# Patient Record
Sex: FEMALE | Race: WHITE | Employment: UNEMPLOYED | URBAN - METROPOLITAN AREA
[De-identification: names, ages, dates, MRNs, and addresses within clinical notes are randomized per-mention and may not be internally consistent; named-entity substitution may affect disease eponyms.]

---

## 2018-01-01 ENCOUNTER — HOSPITAL ENCOUNTER (INPATIENT)
Age: 83
LOS: 1 days | DRG: 296 | End: 2018-05-18
Attending: INTERNAL MEDICINE | Admitting: INTERNAL MEDICINE
Payer: MEDICARE

## 2018-01-01 ENCOUNTER — APPOINTMENT (OUTPATIENT)
Dept: CT IMAGING | Age: 83
DRG: 296 | End: 2018-01-01
Attending: INTERNAL MEDICINE
Payer: MEDICARE

## 2018-01-01 ENCOUNTER — HOSPITAL ENCOUNTER (OUTPATIENT)
Dept: LAB | Age: 83
Discharge: HOME | End: 2018-04-21
Payer: MEDICARE

## 2018-01-01 ENCOUNTER — APPOINTMENT (OUTPATIENT)
Dept: GENERAL RADIOLOGY | Age: 83
End: 2018-01-01
Attending: NURSE PRACTITIONER
Payer: MEDICARE

## 2018-01-01 ENCOUNTER — HOSPITAL ENCOUNTER (EMERGENCY)
Age: 83
Discharge: HOME | End: 2018-03-31
Attending: EMERGENCY MEDICINE
Payer: MEDICARE

## 2018-01-01 ENCOUNTER — APPOINTMENT (OUTPATIENT)
Dept: GENERAL RADIOLOGY | Age: 83
DRG: 296 | End: 2018-01-01
Attending: INTERNAL MEDICINE
Payer: MEDICARE

## 2018-01-01 VITALS
HEART RATE: 90 BPM | RESPIRATION RATE: 18 BRPM | HEIGHT: 62 IN | DIASTOLIC BLOOD PRESSURE: 65 MMHG | BODY MASS INDEX: 16.05 KG/M2 | OXYGEN SATURATION: 100 % | WEIGHT: 87.2 LBS | TEMPERATURE: 98 F | SYSTOLIC BLOOD PRESSURE: 95 MMHG

## 2018-01-01 DIAGNOSIS — R77.8 ELEVATED TROPONIN: ICD-10-CM

## 2018-01-01 DIAGNOSIS — R09.2 RESPIRATORY ARREST (HCC): Primary | ICD-10-CM

## 2018-01-01 DIAGNOSIS — M79.89 LEG SWELLING: ICD-10-CM

## 2018-01-01 DIAGNOSIS — A41.9 SEPSIS, DUE TO UNSPECIFIED ORGANISM: ICD-10-CM

## 2018-01-01 DIAGNOSIS — G93.1 ANOXIC BRAIN DAMAGE (HCC): ICD-10-CM

## 2018-01-01 DIAGNOSIS — R06.02 SOB (SHORTNESS OF BREATH): ICD-10-CM

## 2018-01-01 DIAGNOSIS — R91.8 PULMONARY INFILTRATES: ICD-10-CM

## 2018-01-01 DIAGNOSIS — R79.89 ELEVATED BRAIN NATRIURETIC PEPTIDE (BNP) LEVEL: Primary | ICD-10-CM

## 2018-01-01 DIAGNOSIS — E87.1 HYPONATREMIA: ICD-10-CM

## 2018-01-01 DIAGNOSIS — I46.9 CARDIAC ARREST (HCC): ICD-10-CM

## 2018-01-01 LAB
ALBUMIN SERPL-MCNC: 2.9 G/DL (ref 3.4–5)
ALBUMIN SERPL-MCNC: 3.4 G/DL (ref 3.4–5)
ALBUMIN/GLOB SERPL: 0.7 {RATIO} (ref 0.8–1.7)
ALBUMIN/GLOB SERPL: 0.7 {RATIO} (ref 0.8–1.7)
ALP SERPL-CCNC: 150 U/L (ref 45–117)
ALP SERPL-CCNC: 81 U/L (ref 45–117)
ALT SERPL-CCNC: 25 U/L (ref 13–56)
ALT SERPL-CCNC: 940 U/L (ref 13–56)
ANION GAP BLD CALC-SCNC: 21 MMOL/L (ref 10–20)
ANION GAP SERPL CALC-SCNC: 21 MMOL/L (ref 3–18)
ANION GAP SERPL CALC-SCNC: 6 MMOL/L (ref 3–18)
ANION GAP SERPL CALC-SCNC: 8 MMOL/L (ref 3–18)
APPEARANCE UR: CLEAR
APPEARANCE UR: CLEAR
ARTERIAL PATENCY WRIST A: YES
AST SERPL-CCNC: 1246 U/L (ref 15–37)
AST SERPL-CCNC: 23 U/L (ref 15–37)
ATRIAL RATE: 91 BPM
BACTERIA URNS QL MICRO: ABNORMAL /HPF
BACTERIA URNS QL MICRO: NEGATIVE /HPF
BASE DEFICIT BLD-SCNC: 18 MMOL/L
BASOPHILS # BLD: 0 K/UL (ref 0–0.06)
BASOPHILS # BLD: 0 K/UL (ref 0–0.06)
BASOPHILS # BLD: 0.1 K/UL (ref 0–0.1)
BASOPHILS NFR BLD: 0 % (ref 0–2)
BASOPHILS NFR BLD: 0 % (ref 0–2)
BASOPHILS NFR BLD: 1 % (ref 0–3)
BDY SITE: ABNORMAL
BILIRUB SERPL-MCNC: 0.3 MG/DL (ref 0.2–1)
BILIRUB SERPL-MCNC: 0.5 MG/DL (ref 0.2–1)
BILIRUB UR QL: NEGATIVE
BILIRUB UR QL: NEGATIVE
BNP SERPL-MCNC: 9264 PG/ML (ref 0–1800)
BUN BLD-MCNC: 32 MG/DL (ref 7–18)
BUN SERPL-MCNC: 24 MG/DL (ref 7–18)
BUN SERPL-MCNC: 25 MG/DL (ref 7–18)
BUN SERPL-MCNC: 26 MG/DL (ref 7–18)
BUN/CREAT SERPL: 17 (ref 12–20)
BUN/CREAT SERPL: 20 (ref 12–20)
BUN/CREAT SERPL: 25 (ref 12–20)
CA-I BLD-MCNC: 1.09 MMOL/L (ref 1.12–1.32)
CALCIUM SERPL-MCNC: 8.1 MG/DL (ref 8.5–10.1)
CALCIUM SERPL-MCNC: 8.9 MG/DL (ref 8.5–10.1)
CALCIUM SERPL-MCNC: 9 MG/DL (ref 8.5–10.1)
CALCULATED P AXIS, ECG09: 70 DEGREES
CALCULATED R AXIS, ECG10: -30 DEGREES
CALCULATED T AXIS, ECG11: 86 DEGREES
CHLORIDE BLD-SCNC: 96 MMOL/L (ref 100–108)
CHLORIDE SERPL-SCNC: 93 MMOL/L (ref 100–108)
CHLORIDE SERPL-SCNC: 94 MMOL/L (ref 100–108)
CHLORIDE SERPL-SCNC: 95 MMOL/L (ref 100–108)
CK MB CFR SERPL CALC: 1.8 % (ref 0–4)
CK MB SERPL-MCNC: 2.9 NG/ML (ref 5–25)
CK SERPL-CCNC: 157 U/L (ref 26–192)
CO2 BLD-SCNC: 18 MMOL/L (ref 19–24)
CO2 SERPL-SCNC: 16 MMOL/L (ref 21–32)
CO2 SERPL-SCNC: 28 MMOL/L (ref 21–32)
CO2 SERPL-SCNC: 30 MMOL/L (ref 21–32)
COLOR UR: YELLOW
COLOR UR: YELLOW
CREAT SERPL-MCNC: 1.06 MG/DL (ref 0.6–1.3)
CREAT SERPL-MCNC: 1.21 MG/DL (ref 0.6–1.3)
CREAT SERPL-MCNC: 1.49 MG/DL (ref 0.6–1.3)
CREAT UR-MCNC: 1.4 MG/DL (ref 0.6–1.3)
DIAGNOSIS, 93000: NORMAL
DIFFERENTIAL METHOD BLD: ABNORMAL
EOSINOPHIL # BLD: 0 K/UL (ref 0–0.4)
EOSINOPHIL # BLD: 0.6 K/UL (ref 0–0.4)
EOSINOPHIL # BLD: 0.8 K/UL (ref 0–0.4)
EOSINOPHIL NFR BLD: 0 % (ref 0–5)
EOSINOPHIL NFR BLD: 7 % (ref 0–5)
EOSINOPHIL NFR BLD: 8 % (ref 0–5)
EPITH CASTS URNS QL MICRO: ABNORMAL /LPF (ref 0–5)
EPITH CASTS URNS QL MICRO: NORMAL /LPF (ref 0–5)
ERYTHROCYTE [DISTWIDTH] IN BLOOD BY AUTOMATED COUNT: 12.7 % (ref 11.6–14.5)
ERYTHROCYTE [DISTWIDTH] IN BLOOD BY AUTOMATED COUNT: 13.8 % (ref 11.6–14.5)
ERYTHROCYTE [DISTWIDTH] IN BLOOD BY AUTOMATED COUNT: 14.3 % (ref 11.6–14.5)
FAX TO INFO,FAXT: NORMAL
FAX TO NUMBER,FAXN: NORMAL
GAS FLOW.O2 O2 DELIVERY SYS: ABNORMAL L/MIN
GAS FLOW.O2 SETTING OXYMISER: 16 BPM
GLOBULIN SER CALC-MCNC: 3.9 G/DL (ref 2–4)
GLOBULIN SER CALC-MCNC: 4.6 G/DL (ref 2–4)
GLUCOSE BLD STRIP.AUTO-MCNC: 164 MG/DL (ref 74–106)
GLUCOSE SERPL-MCNC: 104 MG/DL (ref 74–99)
GLUCOSE SERPL-MCNC: 175 MG/DL (ref 74–99)
GLUCOSE SERPL-MCNC: 87 MG/DL (ref 74–99)
GLUCOSE UR STRIP.AUTO-MCNC: NEGATIVE MG/DL
GLUCOSE UR STRIP.AUTO-MCNC: NEGATIVE MG/DL
HCO3 BLD-SCNC: 12.9 MMOL/L (ref 22–26)
HCT VFR BLD AUTO: 34.9 % (ref 35–45)
HCT VFR BLD AUTO: 36 % (ref 35–45)
HCT VFR BLD AUTO: 36.3 % (ref 35–45)
HCT VFR BLD CALC: 36 % (ref 36–49)
HGB BLD-MCNC: 10.7 G/DL (ref 12–16)
HGB BLD-MCNC: 11.7 G/DL (ref 12–16)
HGB BLD-MCNC: 11.9 G/DL (ref 12–16)
HGB BLD-MCNC: 12.2 G/DL (ref 12–16)
HGB UR QL STRIP: ABNORMAL
HGB UR QL STRIP: NEGATIVE
INSPIRATION.DURATION SETTING TIME VENT: 0.9 SEC
KETONES UR QL STRIP.AUTO: NEGATIVE MG/DL
KETONES UR QL STRIP.AUTO: NEGATIVE MG/DL
LACTATE SERPL-SCNC: 12.4 MMOL/L (ref 0.4–2)
LEUKOCYTE ESTERASE UR QL STRIP.AUTO: NEGATIVE
LEUKOCYTE ESTERASE UR QL STRIP.AUTO: NEGATIVE
LYMPHOCYTES # BLD: 1.8 K/UL (ref 0.9–3.6)
LYMPHOCYTES # BLD: 1.9 K/UL (ref 0.9–3.6)
LYMPHOCYTES # BLD: 2.8 K/UL (ref 0.8–3.5)
LYMPHOCYTES NFR BLD: 19 % (ref 21–52)
LYMPHOCYTES NFR BLD: 22 % (ref 20–51)
LYMPHOCYTES NFR BLD: 23 % (ref 21–52)
MAGNESIUM SERPL-MCNC: 1.9 MG/DL (ref 1.6–2.6)
MCH RBC QN AUTO: 31.3 PG (ref 24–34)
MCH RBC QN AUTO: 31.4 PG (ref 24–34)
MCH RBC QN AUTO: 31.6 PG (ref 24–34)
MCHC RBC AUTO-ENTMCNC: 30.7 G/DL (ref 31–37)
MCHC RBC AUTO-ENTMCNC: 32.2 G/DL (ref 31–37)
MCHC RBC AUTO-ENTMCNC: 33.1 G/DL (ref 31–37)
MCV RBC AUTO: 102.9 FL (ref 74–97)
MCV RBC AUTO: 94.7 FL (ref 74–97)
MCV RBC AUTO: 97.3 FL (ref 74–97)
MONOCYTES # BLD: 0.6 K/UL (ref 0.05–1.2)
MONOCYTES # BLD: 0.7 K/UL (ref 0.05–1.2)
MONOCYTES # BLD: 1.1 K/UL (ref 0–1)
MONOCYTES NFR BLD: 6 % (ref 3–10)
MONOCYTES NFR BLD: 8 % (ref 3–10)
MONOCYTES NFR BLD: 9 % (ref 2–9)
MUCOUS THREADS URNS QL MICRO: ABNORMAL /LPF
NEUTS BAND NFR BLD MANUAL: 28 % (ref 0–5)
NEUTS SEG # BLD: 4.9 K/UL (ref 1.8–8)
NEUTS SEG # BLD: 5 K/UL (ref 1.8–8)
NEUTS SEG # BLD: 6.4 K/UL (ref 1.8–8)
NEUTS SEG NFR BLD: 40 % (ref 42–75)
NEUTS SEG NFR BLD: 62 % (ref 40–73)
NEUTS SEG NFR BLD: 67 % (ref 40–73)
NITRITE UR QL STRIP.AUTO: NEGATIVE
NITRITE UR QL STRIP.AUTO: NEGATIVE
O2/TOTAL GAS SETTING VFR VENT: 100 %
P-R INTERVAL, ECG05: 164 MS
PCO2 BLD: 50.2 MMHG (ref 35–45)
PEEP RESPIRATORY: 5 CMH2O
PH BLD: 7.02 [PH] (ref 7.35–7.45)
PH UR STRIP: 6 [PH] (ref 5–8)
PH UR STRIP: 7 [PH] (ref 5–8)
PLATELET # BLD AUTO: 167 K/UL (ref 135–420)
PLATELET # BLD AUTO: 238 K/UL (ref 135–420)
PLATELET # BLD AUTO: 314 K/UL (ref 135–420)
PLATELET COMMENTS,PCOM: ABNORMAL
PMV BLD AUTO: 10.1 FL (ref 9.2–11.8)
PMV BLD AUTO: 9.1 FL (ref 9.2–11.8)
PMV BLD AUTO: 9.5 FL (ref 9.2–11.8)
PO2 BLD: 495 MMHG (ref 80–100)
POTASSIUM BLD-SCNC: 4.1 MMOL/L (ref 3.5–5.5)
POTASSIUM SERPL-SCNC: 4.2 MMOL/L (ref 3.5–5.5)
POTASSIUM SERPL-SCNC: 4.3 MMOL/L (ref 3.5–5.5)
POTASSIUM SERPL-SCNC: 5 MMOL/L (ref 3.5–5.5)
PROT SERPL-MCNC: 6.8 G/DL (ref 6.4–8.2)
PROT SERPL-MCNC: 8 G/DL (ref 6.4–8.2)
PROT UR STRIP-MCNC: 100 MG/DL
PROT UR STRIP-MCNC: 300 MG/DL
Q-T INTERVAL, ECG07: 364 MS
QRS DURATION, ECG06: 84 MS
QTC CALCULATION (BEZET), ECG08: 447 MS
RBC # BLD AUTO: 3.39 M/UL (ref 4.2–5.3)
RBC # BLD AUTO: 3.73 M/UL (ref 4.2–5.3)
RBC # BLD AUTO: 3.8 M/UL (ref 4.2–5.3)
RBC #/AREA URNS HPF: ABNORMAL /HPF (ref 0–5)
RBC #/AREA URNS HPF: NORMAL /HPF (ref 0–5)
RBC MORPH BLD: ABNORMAL
SAO2 % BLD: 100 % (ref 92–97)
SERVICE CMNT-IMP: ABNORMAL
SODIUM BLD-SCNC: 131 MMOL/L (ref 136–145)
SODIUM SERPL-SCNC: 129 MMOL/L (ref 136–145)
SODIUM SERPL-SCNC: 131 MMOL/L (ref 136–145)
SODIUM SERPL-SCNC: 131 MMOL/L (ref 136–145)
SP GR UR REFRACTOMETRY: 1.01 (ref 1–1.03)
SP GR UR REFRACTOMETRY: 1.02 (ref 1–1.03)
SPECIMEN TYPE: ABNORMAL
TOTAL RESP. RATE, ITRR: 16
TROPONIN I BLD-MCNC: 0.84 NG/ML (ref 0–0.08)
TROPONIN I SERPL-MCNC: 1.45 NG/ML (ref 0–0.06)
UROBILINOGEN UR QL STRIP.AUTO: 0.2 EU/DL (ref 0.2–1)
UROBILINOGEN UR QL STRIP.AUTO: 0.2 EU/DL (ref 0.2–1)
VENTILATION MODE VENT: ABNORMAL
VENTRICULAR RATE, ECG03: 91 BPM
VOLUME CONTROL IVLC: YES
VT SETTING VENT: 360 ML
WBC # BLD AUTO: 12.5 K/UL (ref 4.6–13.2)
WBC # BLD AUTO: 8 K/UL (ref 4.6–13.2)
WBC # BLD AUTO: 9.5 K/UL (ref 4.6–13.2)
WBC URNS QL MICRO: ABNORMAL /HPF (ref 0–5)
WBC URNS QL MICRO: NORMAL /HPF (ref 0–5)

## 2018-01-01 PROCEDURE — 74011000250 HC RX REV CODE- 250: Performed by: INTERNAL MEDICINE

## 2018-01-01 PROCEDURE — 77030012003 HC AIRWY OP BRMN VYRM -A

## 2018-01-01 PROCEDURE — 82803 BLOOD GASES ANY COMBINATION: CPT

## 2018-01-01 PROCEDURE — 80048 BASIC METABOLIC PNL TOTAL CA: CPT | Performed by: FAMILY MEDICINE

## 2018-01-01 PROCEDURE — 81001 URINALYSIS AUTO W/SCOPE: CPT | Performed by: INTERNAL MEDICINE

## 2018-01-01 PROCEDURE — 80053 COMPREHEN METABOLIC PANEL: CPT | Performed by: INTERNAL MEDICINE

## 2018-01-01 PROCEDURE — 74011250636 HC RX REV CODE- 250/636: Performed by: NURSE PRACTITIONER

## 2018-01-01 PROCEDURE — 83880 ASSAY OF NATRIURETIC PEPTIDE: CPT | Performed by: NURSE PRACTITIONER

## 2018-01-01 PROCEDURE — 99285 EMERGENCY DEPT VISIT HI MDM: CPT

## 2018-01-01 PROCEDURE — 77030020454 HC TU ET CUF HI/LO COVD -B

## 2018-01-01 PROCEDURE — 5A1935Z RESPIRATORY VENTILATION, LESS THAN 24 CONSECUTIVE HOURS: ICD-10-PCS | Performed by: INTERNAL MEDICINE

## 2018-01-01 PROCEDURE — 74011000258 HC RX REV CODE- 258: Performed by: INTERNAL MEDICINE

## 2018-01-01 PROCEDURE — 77030018846 HC SOL IRR STRL H20 ICUM -A

## 2018-01-01 PROCEDURE — 74011000250 HC RX REV CODE- 250

## 2018-01-01 PROCEDURE — 85025 COMPLETE CBC W/AUTO DIFF WBC: CPT | Performed by: NURSE PRACTITIONER

## 2018-01-01 PROCEDURE — 93005 ELECTROCARDIOGRAM TRACING: CPT

## 2018-01-01 PROCEDURE — 83735 ASSAY OF MAGNESIUM: CPT | Performed by: NURSE PRACTITIONER

## 2018-01-01 PROCEDURE — 85025 COMPLETE CBC W/AUTO DIFF WBC: CPT | Performed by: INTERNAL MEDICINE

## 2018-01-01 PROCEDURE — 74011250636 HC RX REV CODE- 250/636

## 2018-01-01 PROCEDURE — 65610000006 HC RM INTENSIVE CARE

## 2018-01-01 PROCEDURE — 71045 X-RAY EXAM CHEST 1 VIEW: CPT

## 2018-01-01 PROCEDURE — 96374 THER/PROPH/DIAG INJ IV PUSH: CPT

## 2018-01-01 PROCEDURE — 36415 COLL VENOUS BLD VENIPUNCTURE: CPT | Performed by: FAMILY MEDICINE

## 2018-01-01 PROCEDURE — 74011250636 HC RX REV CODE- 250/636: Performed by: INTERNAL MEDICINE

## 2018-01-01 PROCEDURE — 51702 INSERT TEMP BLADDER CATH: CPT

## 2018-01-01 PROCEDURE — 80047 BASIC METABLC PNL IONIZED CA: CPT

## 2018-01-01 PROCEDURE — 71046 X-RAY EXAM CHEST 2 VIEWS: CPT

## 2018-01-01 PROCEDURE — 36600 WITHDRAWAL OF ARTERIAL BLOOD: CPT

## 2018-01-01 PROCEDURE — 92950 HEART/LUNG RESUSCITATION CPR: CPT

## 2018-01-01 PROCEDURE — 82550 ASSAY OF CK (CPK): CPT | Performed by: INTERNAL MEDICINE

## 2018-01-01 PROCEDURE — 83605 ASSAY OF LACTIC ACID: CPT | Performed by: INTERNAL MEDICINE

## 2018-01-01 PROCEDURE — 87040 BLOOD CULTURE FOR BACTERIA: CPT | Performed by: INTERNAL MEDICINE

## 2018-01-01 PROCEDURE — 80053 COMPREHEN METABOLIC PANEL: CPT | Performed by: NURSE PRACTITIONER

## 2018-01-01 PROCEDURE — 31500 INSERT EMERGENCY AIRWAY: CPT

## 2018-01-01 PROCEDURE — 94002 VENT MGMT INPAT INIT DAY: CPT

## 2018-01-01 PROCEDURE — 81001 URINALYSIS AUTO W/SCOPE: CPT | Performed by: NURSE PRACTITIONER

## 2018-01-01 PROCEDURE — 96361 HYDRATE IV INFUSION ADD-ON: CPT

## 2018-01-01 PROCEDURE — 77030028584 HC ELECTRD ECG PHYS -B

## 2018-01-01 PROCEDURE — 84484 ASSAY OF TROPONIN QUANT: CPT | Performed by: INTERNAL MEDICINE

## 2018-01-01 PROCEDURE — 85025 COMPLETE CBC W/AUTO DIFF WBC: CPT | Performed by: FAMILY MEDICINE

## 2018-01-01 RX ORDER — FUROSEMIDE 10 MG/ML
20 INJECTION INTRAMUSCULAR; INTRAVENOUS
Status: COMPLETED | OUTPATIENT
Start: 2018-01-01 | End: 2018-01-01

## 2018-01-01 RX ORDER — LORAZEPAM 2 MG/ML
1 CONCENTRATE ORAL
Status: DISCONTINUED | OUTPATIENT
Start: 2018-01-01 | End: 2018-05-19 | Stop reason: HOSPADM

## 2018-01-01 RX ORDER — EPINEPHRINE 0.1 MG/ML
0.5 INJECTION INTRACARDIAC; INTRAVENOUS
Status: COMPLETED | OUTPATIENT
Start: 2018-01-01 | End: 2018-01-01

## 2018-01-01 RX ORDER — EPINEPHRINE 0.1 MG/ML
1 INJECTION INTRACARDIAC; INTRAVENOUS
Status: COMPLETED | OUTPATIENT
Start: 2018-01-01 | End: 2018-01-01

## 2018-01-01 RX ORDER — ACETAMINOPHEN 650 MG/1
650 SUPPOSITORY RECTAL
Status: DISCONTINUED | OUTPATIENT
Start: 2018-01-01 | End: 2018-05-19 | Stop reason: HOSPADM

## 2018-01-01 RX ORDER — LEVOFLOXACIN 5 MG/ML
750 INJECTION, SOLUTION INTRAVENOUS ONCE
Status: DISCONTINUED | OUTPATIENT
Start: 2018-01-01 | End: 2018-05-19 | Stop reason: HOSPADM

## 2018-01-01 RX ORDER — IBUPROFEN 200 MG
1 TABLET ORAL
Status: DISCONTINUED | OUTPATIENT
Start: 2018-01-01 | End: 2018-01-01

## 2018-01-01 RX ORDER — ACETAMINOPHEN 325 MG/1
650 TABLET ORAL
Status: DISCONTINUED | OUTPATIENT
Start: 2018-01-01 | End: 2018-05-19 | Stop reason: HOSPADM

## 2018-01-01 RX ORDER — MORPHINE SULFATE 5 MG/ML
INJECTION, SOLUTION INTRAVENOUS CONTINUOUS
Status: DISCONTINUED | OUTPATIENT
Start: 2018-01-01 | End: 2018-01-01

## 2018-01-01 RX ORDER — MORPHINE SULFATE 5 MG/ML
INJECTION, SOLUTION INTRAVENOUS CONTINUOUS
Status: DISCONTINUED | OUTPATIENT
Start: 2018-01-01 | End: 2018-05-19 | Stop reason: HOSPADM

## 2018-01-01 RX ORDER — DOPAMINE HYDROCHLORIDE 160 MG/100ML
5 INJECTION, SOLUTION INTRAVENOUS
Status: DISCONTINUED | OUTPATIENT
Start: 2018-01-01 | End: 2018-05-19 | Stop reason: HOSPADM

## 2018-01-01 RX ORDER — ONDANSETRON 4 MG/1
4 TABLET, ORALLY DISINTEGRATING ORAL
Status: DISCONTINUED | OUTPATIENT
Start: 2018-01-01 | End: 2018-05-19 | Stop reason: HOSPADM

## 2018-01-01 RX ORDER — HALOPERIDOL 2 MG/ML
2 SOLUTION ORAL
Status: DISCONTINUED | OUTPATIENT
Start: 2018-01-01 | End: 2018-05-19 | Stop reason: HOSPADM

## 2018-01-01 RX ORDER — SODIUM BICARBONATE 1 MEQ/ML
50 SYRINGE (ML) INTRAVENOUS
Status: COMPLETED | OUTPATIENT
Start: 2018-01-01 | End: 2018-01-01

## 2018-01-01 RX ORDER — NOREPINEPHRINE BITARTRATE 1 MG/ML
8 INJECTION, SOLUTION INTRAVENOUS
Status: DISCONTINUED | OUTPATIENT
Start: 2018-01-01 | End: 2018-01-01 | Stop reason: CLARIF

## 2018-01-01 RX ORDER — NOREPINEPHRINE BITARTRATE/D5W 8 MG/250ML
PLASTIC BAG, INJECTION (ML) INTRAVENOUS
Status: DISCONTINUED
Start: 2018-01-01 | End: 2018-05-19 | Stop reason: HOSPADM

## 2018-01-01 RX ORDER — VASOPRESSIN 20 U/ML
5 INJECTION PARENTERAL ONCE
Status: DISCONTINUED | OUTPATIENT
Start: 2018-01-01 | End: 2018-05-19 | Stop reason: HOSPADM

## 2018-01-01 RX ORDER — IBUPROFEN 200 MG
1 TABLET ORAL DAILY
Status: DISCONTINUED | OUTPATIENT
Start: 2018-01-01 | End: 2018-01-01

## 2018-01-01 RX ORDER — SODIUM CHLORIDE 9 MG/ML
500 INJECTION, SOLUTION INTRAVENOUS CONTINUOUS
Status: DISCONTINUED | OUTPATIENT
Start: 2018-01-01 | End: 2018-01-01 | Stop reason: HOSPADM

## 2018-01-01 RX ORDER — DOPAMINE HYDROCHLORIDE 160 MG/100ML
INJECTION, SOLUTION INTRAVENOUS
Status: COMPLETED
Start: 2018-01-01 | End: 2018-01-01

## 2018-01-01 RX ORDER — SODIUM CHLORIDE 0.9 % (FLUSH) 0.9 %
5-10 SYRINGE (ML) INJECTION AS NEEDED
Status: DISCONTINUED | OUTPATIENT
Start: 2018-01-01 | End: 2018-05-19 | Stop reason: HOSPADM

## 2018-01-01 RX ORDER — VASOPRESSIN 20 U/ML
INJECTION PARENTERAL
Status: DISCONTINUED
Start: 2018-01-01 | End: 2018-05-19 | Stop reason: HOSPADM

## 2018-01-01 RX ORDER — LORAZEPAM 2 MG/ML
1 INJECTION INTRAMUSCULAR
Status: DISCONTINUED | OUTPATIENT
Start: 2018-01-01 | End: 2018-05-19 | Stop reason: HOSPADM

## 2018-01-01 RX ORDER — HALOPERIDOL 5 MG/ML
2 INJECTION INTRAMUSCULAR
Status: DISCONTINUED | OUTPATIENT
Start: 2018-01-01 | End: 2018-05-19 | Stop reason: HOSPADM

## 2018-01-01 RX ORDER — FUROSEMIDE 40 MG/1
20 TABLET ORAL DAILY
Qty: 10 TAB | Refills: 0 | Status: SHIPPED | OUTPATIENT
Start: 2018-01-01 | End: 2018-01-01

## 2018-01-01 RX ORDER — NOREPINEPHRINE BITARTRATE/D5W 8 MG/250ML
8 PLASTIC BAG, INJECTION (ML) INTRAVENOUS CONTINUOUS
Status: DISCONTINUED | OUTPATIENT
Start: 2018-01-01 | End: 2018-05-19 | Stop reason: HOSPADM

## 2018-01-01 RX ORDER — EPINEPHRINE 0.1 MG/ML
INJECTION INTRACARDIAC; INTRAVENOUS
Status: DISCONTINUED
Start: 2018-01-01 | End: 2018-05-19 | Stop reason: HOSPADM

## 2018-01-01 RX ORDER — SODIUM CHLORIDE 9 MG/ML
1000 INJECTION, SOLUTION INTRAVENOUS ONCE
Status: COMPLETED | OUTPATIENT
Start: 2018-01-01 | End: 2018-01-01

## 2018-01-01 RX ORDER — SODIUM CHLORIDE 9 MG/ML
150 INJECTION, SOLUTION INTRAVENOUS CONTINUOUS
Status: DISCONTINUED | OUTPATIENT
Start: 2018-01-01 | End: 2018-05-19 | Stop reason: HOSPADM

## 2018-01-01 RX ADMIN — SODIUM CHLORIDE 1000 ML: 900 INJECTION, SOLUTION INTRAVENOUS at 17:19

## 2018-01-01 RX ADMIN — EPINEPHRINE 0.5 MG: 0.1 INJECTION, SOLUTION ENDOTRACHEAL; INTRACARDIAC; INTRAVENOUS at 17:32

## 2018-01-01 RX ADMIN — FUROSEMIDE 20 MG: 10 INJECTION, SOLUTION INTRAMUSCULAR; INTRAVENOUS at 16:49

## 2018-01-01 RX ADMIN — EPINEPHRINE: 0.1 INJECTION, SOLUTION ENDOTRACHEAL; INTRACARDIAC; INTRAVENOUS at 18:35

## 2018-01-01 RX ADMIN — EPINEPHRINE 0.5 MG: 0.1 INJECTION, SOLUTION ENDOTRACHEAL; INTRACARDIAC; INTRAVENOUS at 17:00

## 2018-01-01 RX ADMIN — EPINEPHRINE 1 MG: 0.1 INJECTION, SOLUTION ENDOTRACHEAL; INTRACARDIAC; INTRAVENOUS at 18:59

## 2018-01-01 RX ADMIN — SODIUM CHLORIDE 500 ML: 900 INJECTION, SOLUTION INTRAVENOUS at 16:38

## 2018-01-01 RX ADMIN — SODIUM BICARBONATE 50 MEQ: 84 INJECTION INTRAVENOUS at 19:00

## 2018-01-01 RX ADMIN — EPINEPHRINE 1 MG: 0.1 INJECTION, SOLUTION ENDOTRACHEAL; INTRACARDIAC; INTRAVENOUS at 18:41

## 2018-01-01 RX ADMIN — DOPAMINE HYDROCHLORIDE IN DEXTROSE 20 MCG/KG/MIN: 1.6 INJECTION, SOLUTION INTRAVENOUS at 18:19

## 2018-01-01 RX ADMIN — DOPAMINE HYDROCHLORIDE 20 MCG/KG/MIN: 160 INJECTION, SOLUTION INTRAVENOUS at 18:19

## 2018-01-01 RX ADMIN — SODIUM CHLORIDE 1000 ML: 900 INJECTION, SOLUTION INTRAVENOUS at 19:03

## 2018-01-01 RX ADMIN — SODIUM CHLORIDE 1000 ML: 900 INJECTION, SOLUTION INTRAVENOUS at 17:20

## 2018-01-01 RX ADMIN — VASOPRESSIN 0.04 UNITS/MIN: 20 INJECTION INTRAVENOUS at 19:10

## 2018-01-01 RX ADMIN — EPINEPHRINE: 0.1 INJECTION, SOLUTION ENDOTRACHEAL; INTRACARDIAC; INTRAVENOUS at 18:21

## 2018-03-31 NOTE — ED NOTES
Pt ambulated with a slow, steady gait with a walker to the bathroom with no difficulty. Per provider administered 300 mL IV infusion over 30 mins. Medicated per MAR. Pt in NAD, no further needs expressed.

## 2018-03-31 NOTE — ED NOTES
Rounded on pt. Pt up to bedside commode to void. Repeat VS obtained. Pt in NAD at this time. IV fluids complete, 300 mL of NS infused per verbal order of provider. Call light within reach and family remains at bedside.

## 2018-03-31 NOTE — DISCHARGE INSTRUCTIONS
Hyponatremia: Care Instructions  Your Care Instructions    Hyponatremia (say \"lt-hm-[id number]-[id number]-massiel-uh\") means that you don't have enough sodium in your blood. It can cause nausea, vomiting, and headaches. Or you may not feel hungry. In serious cases, it can cause seizures, a coma, or even death. Hyponatremia is not a disease. It is a problem caused by something else, such as medicines or exercising for a long time in hot weather. You can get hyponatremia if you lose a lot of fluids and then you drink a lot of water or other liquids that don't have much sodium. You can also get it if you have kidney, liver, heart, or other health problems. Treatment is focused on getting your sodium levels back to normal.  Follow-up care is a key part of your treatment and safety. Be sure to make and go to all appointments, and call your doctor if you are having problems. It's also a good idea to know your test results and keep a list of the medicines you take. How can you care for yourself at home? · If your doctor recommends it, drink fluids that have sodium. Sports drinks are a good choice. Or you can eat salty foods. · If your doctor recommends it, limit the amount of water you drink. And limit fluids that are mostly water. These include tea, coffee, and juice. · Take your medicines exactly as prescribed. Call your doctor if you have any problems with your medicine. · Get your sodium levels tested when your doctor tells you to. When should you call for help? Call 911 anytime you think you may need emergency care. For example, call if:  ? · You have a seizure. ? · You passed out (lost consciousness). ?Call your doctor now or seek immediate medical care if:  ? · You are confused or it is hard to focus. ? · You have little or no appetite. ? · You feel sick to your stomach or you vomit. ? · You have a headache. ? · You have mood changes. ? · You feel more tired than usual.   ? Watch closely for changes in your health, and be sure to contact your doctor if:  ? · You do not get better as expected. Where can you learn more? Go to http://jamilah-brie.info/. Enter H562 in the search box to learn more about \"Hyponatremia: Care Instructions. \"  Current as of: October 14, 2016  Content Version: 11.4  © 4631-6613 Written. Care instructions adapted under license by [employer] (which disclaims liability or warranty for this information). If you have questions about a medical condition or this instruction, always ask your healthcare professional. [place] [address] any warranty or liability for your use of this information.

## 2018-03-31 NOTE — ED NOTES
Discharge instructions reviewed with opportunity for questions provided. Pt vocalized understanding. Armband removed and shredded. Pt stable condition at time of discharge. Pt educated on importance of re-checking electrolytes on Monday.

## 2018-03-31 NOTE — ED PROVIDER NOTES
EMERGENCY DEPARTMENT HISTORY AND PHYSICAL EXAM    Date: 3/31/2018  Patient Name: Alessandro Landis    History of Presenting Illness     Chief Complaint   Patient presents with    Abnormal Lab Results         History Provided By: Patient, Patient's Daughter and Patient's Granddaughter    Chief Complaint: Leg swelling  Duration: 2 Weeks  Timing:  Acute  Location: Bilateral legs  Modifying Factors: No relieving or worsening factors  Associated Symptoms: SOB upon exertion    Additional History (Context):   2:25 PM   Alessandro Landis is a 80 y.o. female  who presents to the emergency department with family due to bilateral leg swelling x~ 2 weeks with acute onset. Associated sxs include SOB upon exertion. Daughter says that the pt had blood work done last week and her pro BNP was around 00133. Adds that the pt had a CXR at [facility] 3-4 weeks ago and was diagnosed with PNA and was taking abx to alleviate this condition. Says that a few days later, Krissy Shall called again and told her to stop taking the abx because she did not have PNA. When prompted, pt and family were unclear on any advanced directive but were leaning towards a DNR. Pt denies hx of CHF, hx of renal disease, hx of cardiac issues, chest pain, and any other sxs or complaints. PCP: Austyn Thompson MD        Past History     Past Medical History:  History reviewed. No pertinent past medical history. Past Surgical History:  Past Surgical History:   Procedure Laterality Date    HX HIP FRACTURE TX         Family History:  History reviewed. No pertinent family history. Social History:  Social History   Substance Use Topics    Smoking status: Current Every Day Smoker     Packs/day: 0.50     Years: 70.00    Smokeless tobacco: Never Used    Alcohol use 4.2 oz/week     7 Glasses of wine per week       Allergies:  No Known Allergies      Review of Systems   Review of Systems   Respiratory: Positive for shortness of breath (upon exertion). Cardiovascular: Positive for leg swelling. Negative for chest pain. All other systems reviewed and are negative. Physical Exam     Vitals:    03/31/18 1500 03/31/18 1600 03/31/18 1647 03/31/18 1721   BP:  152/75 141/72 95/65   Pulse: 90 89 88 90   Resp: 17 22 20 18   Temp:       SpO2:    100%   Weight:       Height:         Physical Exam   Constitutional: She is oriented to person, place, and time. She appears well-developed and well-nourished. No distress. Elderly appearing   HENT:   Head: Normocephalic and atraumatic. Eyes: Conjunctivae are normal. Right eye exhibits no discharge. Left eye exhibits no discharge. No scleral icterus. Neck: Normal range of motion. Neck supple. Cardiovascular: Normal rate, regular rhythm and intact distal pulses. Exam reveals no gallop and no friction rub. Murmur heard. Pulmonary/Chest: Effort normal. No accessory muscle usage. No tachypnea. No respiratory distress. She has decreased breath sounds in the right lower field and the left lower field. She has no wheezes. She has no rhonchi. She has no rales (no definite). Abdominal: Soft. There is no tenderness. Musculoskeletal: Normal range of motion. She exhibits no tenderness. Right lower leg: She exhibits edema (+1). Left lower leg: She exhibits edema (+1). Bilateral Lower Extremities: Positive translucency and edema noted, mild erythema, no warmth, no open areas   Neurological: She is alert and oriented to person, place, and time. Skin: Skin is dry and intact. There is erythema (mild on bilateral lower extremities. no warmth, no open areas. ). Psychiatric: She has a normal mood and affect. Judgment normal.   Nursing note and vitals reviewed.         Diagnostic Study Results     Labs -     Recent Results (from the past 12 hour(s))   CBC WITH AUTOMATED DIFF    Collection Time: 03/31/18  2:22 PM   Result Value Ref Range    WBC 9.5 4.6 - 13.2 K/uL    RBC 3.80 (L) 4.20 - 5.30 M/uL    HGB 11.9 (L) 12.0 - 16.0 g/dL    HCT 36.0 35.0 - 45.0 %    MCV 94.7 74.0 - 97.0 FL    MCH 31.3 24.0 - 34.0 PG    MCHC 33.1 31.0 - 37.0 g/dL    RDW 12.7 11.6 - 14.5 %    PLATELET 204 927 - 289 K/uL    MPV 9.1 (L) 9.2 - 11.8 FL    NEUTROPHILS 67 40 - 73 %    LYMPHOCYTES 19 (L) 21 - 52 %    MONOCYTES 6 3 - 10 %    EOSINOPHILS 8 (H) 0 - 5 %    BASOPHILS 0 0 - 2 %    ABS. NEUTROPHILS 6.4 1.8 - 8.0 K/UL    ABS. LYMPHOCYTES 1.8 0.9 - 3.6 K/UL    ABS. MONOCYTES 0.6 0.05 - 1.2 K/UL    ABS. EOSINOPHILS 0.8 (H) 0.0 - 0.4 K/UL    ABS. BASOPHILS 0.0 0.0 - 0.06 K/UL    DF AUTOMATED     METABOLIC PANEL, COMPREHENSIVE    Collection Time: 03/31/18  2:22 PM   Result Value Ref Range    Sodium 129 (L) 136 - 145 mmol/L    Potassium 5.0 3.5 - 5.5 mmol/L    Chloride 93 (L) 100 - 108 mmol/L    CO2 28 21 - 32 mmol/L    Anion gap 8 3.0 - 18 mmol/L    Glucose 87 74 - 99 mg/dL    BUN 24 (H) 7.0 - 18 MG/DL    Creatinine 1.21 0.6 - 1.3 MG/DL    BUN/Creatinine ratio 20 12 - 20      GFR est AA 51 (L) >60 ml/min/1.73m2    GFR est non-AA 42 (L) >60 ml/min/1.73m2    Calcium 8.9 8.5 - 10.1 MG/DL    Bilirubin, total 0.3 0.2 - 1.0 MG/DL    ALT (SGPT) 25 13 - 56 U/L    AST (SGOT) 23 15 - 37 U/L    Alk.  phosphatase 81 45 - 117 U/L    Protein, total 8.0 6.4 - 8.2 g/dL    Albumin 3.4 3.4 - 5.0 g/dL    Globulin 4.6 (H) 2.0 - 4.0 g/dL    A-G Ratio 0.7 (L) 0.8 - 1.7     MAGNESIUM    Collection Time: 03/31/18  2:22 PM   Result Value Ref Range    Magnesium 1.9 1.6 - 2.6 mg/dL   NT-PRO BNP    Collection Time: 03/31/18  2:22 PM   Result Value Ref Range    NT pro-BNP 9264 (H) 0 - 1800 PG/ML   EKG, 12 LEAD, INITIAL    Collection Time: 03/31/18  2:44 PM   Result Value Ref Range    Ventricular Rate 91 BPM    Atrial Rate 91 BPM    P-R Interval 164 ms    QRS Duration 84 ms    Q-T Interval 364 ms    QTC Calculation (Bezet) 447 ms    Calculated P Axis 70 degrees    Calculated R Axis -30 degrees    Calculated T Axis 86 degrees    Diagnosis       Poor data quality, interpretation may be adversely affected  Sinus rhythm with occasional premature ventricular complexes  Possible Left atrial enlargement  Left axis deviation  Anteroseptal infarct , age undetermined  Left ventricular hypertrophy  ST & T wave abnormality, consider lateral ischemia  Abnormal ECG    Confirmed by Bettie Anderson MD, Enmanuel Leung ([id number]) on 3/31/2018 2:56:27 PM     URINALYSIS W/ RFLX MICROSCOPIC    Collection Time: 03/31/18  3:05 PM   Result Value Ref Range    Color YELLOW      Appearance CLEAR      Specific gravity 1.013 1.005 - 1.030      pH (UA) 7.0 5.0 - 8.0      Protein 100 (A) NEG mg/dL    Glucose NEGATIVE  NEG mg/dL    Ketone NEGATIVE  NEG mg/dL    Bilirubin NEGATIVE  NEG      Blood NEGATIVE  NEG      Urobilinogen 0.2 0.2 - 1.0 EU/dL    Nitrites NEGATIVE  NEG      Leukocyte Esterase NEGATIVE  NEG     URINE MICROSCOPIC ONLY    Collection Time: 03/31/18  3:05 PM   Result Value Ref Range    WBC 0 to 3 0 - 5 /hpf    RBC 0 to 3 0 - 5 /hpf    Epithelial cells FEW 0 - 5 /lpf    Bacteria NEGATIVE  NEG /hpf       Radiologic Studies -   CT Results  (Last 48 hours)    None        CXR Results  (Last 48 hours)               03/31/18 1528  XR CHEST PA LAT Final result    Impression:  IMPRESSION: No acute radiographic cardiopulmonary abnormality. Narrative:  EXAM:  XR CHEST PA LAT       INDICATION:   Shortness of breath       COMPARISON: None. FINDINGS: PA and lateral radiographs of the chest demonstrates no focal   pneumonic opacity, pneumothorax, or pleural effusion. Cardiac size is normal.   Tortuosity of the thoracic aorta is present. No acute osseous abnormality noted,   with mid thoracic spondylosis present. Medications given in the ED-  Medications   0.9% sodium chloride infusion 500 mL (0 mL IntraVENous Stopped 3/31/18 1724)   furosemide (LASIX) injection 20 mg (20 mg IntraVENous Given 3/31/18 1649)         Medical Decision Making   I am the first provider for this patient.     I reviewed the vital signs, available nursing notes, past medical history, past surgical history, family history and social history. Vital Signs-Reviewed the patient's vital signs. Pulse Oximetry Analysis - 99% on room air     Cardiac Monitor:  Rate: 91 bpm  Rhythm: NSR    EKG interpretation: (Preliminary)  2:24 PM   NSR, PVC's, No STEMI, 91 bpm,   EKG read by Jackie Steele NP at 2:53 PM     Records Reviewed: Nursing Notes    Provider Notes (Medical Decision Making):   Ddx: Electrolyte imbalance, new onset heart failure,      Procedures:  Procedures    ED Course:   2:25 PM Initial assessment performed. The patients presenting problems have been discussed, and they are in agreement with the care plan formulated and outlined with them. I have encouraged them to ask questions as they arise throughout their visit. 3:50 PM Discussed patient's history, exam, and available diagnostics results with Aria Schreiber MD, emergency medicine, who recommends 500 mL of normal saline and 20 mL of Lasix. 3:53 PM Discussed results with the pt and her daughter. Understand delicate balance with rehydration and diuresis. Pt would like a nicotine patch at this time and is agreeable to being admitted. Will consult Hospitalist     4:21 PM Discussed patient's history, exam, and available diagnostics results with Lily Renae MD, internal medicine, who advised me to reach out to Angelique Valentin MD to see if he could manage the pt as an outpatient. I discussed the low sodium and the increased BUN upon rehydration. He believes the patient can be managed as an outpatient. 4:24 PM Discussed patient's history, exam, and available diagnostics results with Angelique Valentin MD, cardiology, who is concerned that we don't have a reason for the hyponatremia or labs to compare to see if the hyponatremia is new onset.  Recommends if she is going to be followed outpatient to be started on 20 mg of Lasix daily to have her sodium rechecked Monday or Tuesday by her PCP and see her in his office for further cardiac work up.     4:31 PM Updated on consults with hospitalist and cardiology. Extensive conversation with patient and daughter about risks and benefits of in-patient treatment. They would like to have labs rechecked on Monday with pt's PCP, be started on Lasix, and be follow by Dr. Prem Shearer as an out-patient. They understand the reasons to return to the ED and are offering no questions or concerns at this time. Diagnosis and Disposition       DISCHARGE NOTE:  5:23 PM  [address] [place] [address]  results have been reviewed with her. She has been counseled regarding her diagnosis, treatment, and plan. She verbally conveys understanding and agreement of the signs, symptoms, diagnosis, treatment and prognosis and additionally agrees to follow up as discussed. She also agrees with the care-plan and conveys that all of her questions have been answered. I have also provided discharge instructions for her that include: educational information regarding their diagnosis and treatment, and list of reasons why they would want to return to the ED prior to their follow-up appointment, should her condition change. She has been provided with education for proper emergency department utilization. CLINICAL IMPRESSION:    1. Elevated brain natriuretic peptide (BNP) level    2. SOB (shortness of breath)    3. Leg swelling    4. Hyponatremia        PLAN:  1. D/C Home  2. Discharge Medication List as of 3/31/2018  5:23 PM      START taking these medications    Details   furosemide (LASIX) 40 mg tablet Take 0.5 Tabs by mouth daily for 20 days. , Print, Disp-10 Tab, R-0           3.    Follow-up Information     Follow up With Details Comments Contact Info    Ariadne Guevara MD Schedule an appointment as soon as possible for a visit in 2 days For primary care follow up and to recheck electrolyte levels [address] [place] [address]  [phone]      Marija Guerra MD Schedule an appointment as soon as possible for a visit in 1 week For cardiology follow up [address]  [address]      [facility] EMERGENCY DEPT Go to As needed, if symptoms worsen [address] [address]  [phone]        _______________________________    Attestations: This note is prepared by Ivone Landaverde, acting as Scribe for Roberto Shah NP. Roberto Shah NP:  The scribe's documentation has been prepared under my direction and personally reviewed by me in its entirety.   I confirm that the note above accurately reflects all work, treatment, procedures, and medical decision making performed by me.  _______________________________

## 2018-05-18 PROBLEM — A41.9 SEPSIS (HCC): Status: ACTIVE | Noted: 2018-01-01

## 2018-05-18 PROBLEM — R79.89 ABNORMAL LFTS: Status: ACTIVE | Noted: 2018-01-01

## 2018-05-18 PROBLEM — I95.9 HYPOTENSION: Status: ACTIVE | Noted: 2018-01-01

## 2018-05-18 PROBLEM — R57.9 SHOCK (HCC): Status: ACTIVE | Noted: 2018-01-01

## 2018-05-18 PROBLEM — R09.2 RESPIRATORY ARREST (HCC): Status: ACTIVE | Noted: 2018-01-01

## 2018-05-18 PROBLEM — I46.9 CARDIAC ARREST (HCC): Status: ACTIVE | Noted: 2018-01-01

## 2018-05-18 PROBLEM — E86.0 DEHYDRATION: Status: ACTIVE | Noted: 2018-01-01

## 2018-05-18 PROBLEM — R91.8 PULMONARY INFILTRATES: Status: ACTIVE | Noted: 2018-01-01

## 2018-05-18 PROBLEM — N28.9 RENAL INSUFFICIENCY: Status: ACTIVE | Noted: 2018-01-01

## 2018-05-18 PROBLEM — R77.8 ELEVATED TROPONIN: Status: ACTIVE | Noted: 2018-01-01

## 2018-05-18 NOTE — PROGRESS NOTES
RESPIRATORY NOTE:  Pt arrived intubated via EMS post cardiac arrest at home, 6 ETT, bilateral BS verified, setup on vent, awaiting x-ray to verify tube placement,will continue to monitor.

## 2018-05-18 NOTE — Clinical Note
Status[de-identified] Inpatient [101] Type of Bed: Intensive Care [6] Inpatient Hospitalization Certified Necessary for the Following Reasons: 9. Other (further clarification in H&P documentation) Admitting Diagnosis: Respiratory arrest ([facility] [id number]. 1. ICD-9-CM] Admitting Diagnosis: Sepsis ([facility].) [[id number]] Admitting Diagnosis: Pulmonary infiltrates [856654] Admitting Diagnosis: Dehydration [276.51. ICD-9-CM] Admitting Diagnosis: Renal insufficiency [187549] Admitting Physician: Jj Ledesma [[id number]] Attending Physician: Jj Ledesma [[id number]] Estimated Length of Stay: 2 Midnights Discharge Plan[de-identified] Home with Office Follow-up

## 2018-05-18 NOTE — ED PROVIDER NOTES
EMERGENCY DEPARTMENT HISTORY AND PHYSICAL EXAM    Date: 5/18/2018  Patient Name: Alessandro Landis    History of Presenting Illness     Chief Complaint   Patient presents with    Cardiac arrest         History Provided By: Patient's Daughter and EMS    Chief Complaint: cardiac arrest  Duration: 1 Hours  Timing:  Acute  Location: chest  Severity: Severe    Additional History (Context):   4:55 PM  Alessandro Landis is a 80 y.o. female who presents to the emergency department via EMS on backboard with neck brace C/O cardiac arrest onset PTA. Per daughter pt was having difficulty breathing and coughing so she went out to go buy decongestant and when she got home pt was in agonal breathing. EMS reports when they arrived pt was having agonal breathing and they witnessed arrest. CPR was started by EMS and continued for 15 minutes, rhythm was originally asystole, for which epi was given and cpr started. After a shock pt gained a rhythm. EMS administered total of 4 mg of epinephrine and 2mg of versed. Pt was intubated and had 20 gauge in left AC and IO in RLE in place upon arrival. Daughter mentions pt was diagnosed with DVT one month ago and is being treated with xarelto. Pt is also being treated for CHF. EMS denies trauma, and any other sxs or complaints. Blood sugar 202 in route and HR of 103 upon arrival.     PCP: Austyn Thompson MD        Past History     Past Medical History:  No past medical history on file. Past Surgical History:  Past Surgical History:   Procedure Laterality Date    HX HIP FRACTURE TX         Family History:  No family history on file.     Social History:  Social History   Substance Use Topics    Smoking status: Current Every Day Smoker     Packs/day: 0.50     Years: 70.00    Smokeless tobacco: Never Used    Alcohol use 4.2 oz/week     7 Glasses of wine per week       Allergies:  No Known Allergies      Review of Systems   Review of Systems   Unable to perform ROS: Patient unresponsive Physical Exam     Vitals:    05/18/18 1835 05/18/18 1840 05/18/18 1841 05/18/18 1846   BP: (!) 84/52 (!) 67/54 (!) 67/37 93/73   Pulse: (!) 120 (!) 120 (!) 128 (!) 125   Resp:  20  17   Temp:       SpO2:  100%  99%   Weight:       Height:         Physical Exam   CONSTITUTIONAL: Well-developed, well-nourished individual in full arrest, dusky coloration, BVM ventilation. Patient is intubated. Pt backboarded, removed after some time, no evidence of injury or pathology on back/sacral evaluation. HEAD: Normocephalic, atraumatic. EYES: Pupils are fixed, left 4 mm and right 2 mm. Eyelids, conjunctiva, iris, and sclera are normal.  EARS: External ears are normal.  NOSE: The nose is normal in appearance. MOUTH/DENTAL: The lips are dusky in coloration, gums, and edentulous. Copious Yellow mucus in ET tube which is in place. There are no exudates or erosions on the buccal mucosa. NECK: The trachea is mid-line. The neck is supple and non-tender to palpation. There is no cervical lymphadenopathy. There is no JVD. CHEST: No evidence of trauma or deformity. Non-tender to palpation. No crepitus or paradoxical movements. Chest excursion is non-existent without BVM. There is equal bilateral chest rise with ventilation. CARDIOVASCULAR: The heart is in complete stand-still. LUNGS: Coarse breath sounds with BVM ventilation. No spontaneous respirations. GI/ABDOMEN: The abdomen is not distended in appearance. PELVIS: No evidence of trauma or deformity. Negative pelvic rock. no evidence of instability. MUSCULOSKELETAL: There are no deformities noted in all four extremities. Pulse present and strong carotid and weak in radial.  INTEGUMENTARY: The skin is dusky and cool. NEUROLOGICAL: Unresponsive. PSYCHOLOGICAL: Unresponsive. Bedside FAST shows no pericardial effusion, no right heart strain, with moderate lvef, no obvious valvular mass (MV, AV, TV).  Lungs do not show obvious pericardial effusion, or ptx. No obvious free fluid in ruq, luq, pelvic areas. Diagnostic Study Results     Labs -     Recent Results (from the past 12 hour(s))   LACTIC ACID    Collection Time: 05/18/18  5:05 PM   Result Value Ref Range    Lactic acid 12.4 (HH) 0.4 - 2.0 MMOL/L   METABOLIC PANEL, COMPREHENSIVE    Collection Time: 05/18/18  5:05 PM   Result Value Ref Range    Sodium 131 (L) 136 - 145 mmol/L    Potassium 4.2 3.5 - 5.5 mmol/L    Chloride 94 (L) 100 - 108 mmol/L    CO2 16 (L) 21 - 32 mmol/L    Anion gap 21 (H) 3.0 - 18 mmol/L    Glucose 175 (H) 74 - 99 mg/dL    BUN 25 (H) 7.0 - 18 MG/DL    Creatinine 1.49 (H) 0.6 - 1.3 MG/DL    BUN/Creatinine ratio 17 12 - 20      GFR est AA 40 (L) >60 ml/min/1.73m2    GFR est non-AA 33 (L) >60 ml/min/1.73m2    Calcium 8.1 (L) 8.5 - 10.1 MG/DL    Bilirubin, total 0.5 0.2 - 1.0 MG/DL    ALT (SGPT) 940 (H) 13 - 56 U/L    AST (SGOT) 1246 (H) 15 - 37 U/L    Alk. phosphatase 150 (H) 45 - 117 U/L    Protein, total 6.8 6.4 - 8.2 g/dL    Albumin 2.9 (L) 3.4 - 5.0 g/dL    Globulin 3.9 2.0 - 4.0 g/dL    A-G Ratio 0.7 (L) 0.8 - 1.7     CBC WITH AUTOMATED DIFF    Collection Time: 05/18/18  5:05 PM   Result Value Ref Range    WBC 12.5 4.6 - 13.2 K/uL    RBC 3.39 (L) 4.20 - 5.30 M/uL    HGB 10.7 (L) 12.0 - 16.0 g/dL    HCT 34.9 (L) 35.0 - 45.0 %    .9 (H) 74.0 - 97.0 FL    MCH 31.6 24.0 - 34.0 PG    MCHC 30.7 (L) 31.0 - 37.0 g/dL    RDW 14.3 11.6 - 14.5 %    PLATELET 076 869 - 918 K/uL    MPV 10.1 9.2 - 11.8 FL    NEUTROPHILS 40 (L) 42 - 75 %    BAND NEUTROPHILS 28 (H) 0 - 5 %    LYMPHOCYTES 22 20 - 51 %    MONOCYTES 9 2 - 9 %    EOSINOPHILS 0 0 - 5 %    BASOPHILS 1 0 - 3 %    ABS. NEUTROPHILS 5.0 1.8 - 8.0 K/UL    ABS. LYMPHOCYTES 2.8 0.8 - 3.5 K/UL    ABS. MONOCYTES 1.1 (H) 0 - 1.0 K/UL    ABS. EOSINOPHILS 0.0 0.0 - 0.4 K/UL    ABS.  BASOPHILS 0.1 0.0 - 0.1 K/UL    PLATELET COMMENTS LARGE PLATELETS      RBC COMMENTS MACROCYTOSIS  1+        DF MANUAL     CARDIAC PANEL,(CK, CKMB & TROPONIN) Collection Time: 05/18/18  5:05 PM   Result Value Ref Range     26 - 192 U/L    CK - MB 2.9 <3.6 ng/ml    CK-MB Index 1.8 0.0 - 4.0 %    Troponin-I, Qt. 1.45 (HH) 0.00 - 0.06 NG/ML   POC TROPONIN-I    Collection Time: 05/18/18  5:08 PM   Result Value Ref Range    Troponin-I (POC) 0.84 (HH) 0.00 - 0.08 ng/mL   POC CHEM8    Collection Time: 05/18/18  5:10 PM   Result Value Ref Range    CO2, POC 18 (L) 19 - 24 MMOL/L    Glucose,  (H) 74 - 106 MG/DL    BUN, POC 32 (H) 7 - 18 MG/DL    Creatinine, POC 1.4 (H) 0.6 - 1.3 MG/DL    GFRAA, POC 43 (L) >60 ml/min/1.73m2    GFRNA, POC 36 (L) >60 ml/min/1.73m2    Sodium,  (L) 136 - 145 MMOL/L    Potassium, POC 4.1 3.5 - 5.5 MMOL/L    Calcium, ionized (POC) 1.09 (L) 1.12 - 1.32 mmol/L    Chloride, POC 96 (L) 100 - 108 MMOL/L    Anion gap, POC 21 (H) 10 - 20      Hematocrit, POC 36 36 - 49 %    Hemoglobin, POC 12.2 12 - 16 G/DL   URINALYSIS W/ RFLX MICROSCOPIC    Collection Time: 05/18/18  5:11 PM   Result Value Ref Range    Color YELLOW      Appearance CLEAR      Specific gravity 1.016 1.005 - 1.030      pH (UA) 6.0 5.0 - 8.0      Protein 300 (A) NEG mg/dL    Glucose NEGATIVE  NEG mg/dL    Ketone NEGATIVE  NEG mg/dL    Bilirubin NEGATIVE  NEG      Blood TRACE (A) NEG      Urobilinogen 0.2 0.2 - 1.0 EU/dL    Nitrites NEGATIVE  NEG      Leukocyte Esterase NEGATIVE  NEG     URINE MICROSCOPIC ONLY    Collection Time: 05/18/18  5:11 PM   Result Value Ref Range    WBC 0 to 1 0 - 5 /hpf    RBC 0 to 1 0 - 5 /hpf    Epithelial cells FEW 0 - 5 /lpf    Bacteria 1+ (A) NEG /hpf    Mucus FEW (A) NEG /lpf   EKG, 12 LEAD, INITIAL    Collection Time: 05/18/18  5:17 PM   Result Value Ref Range    Ventricular Rate 105 BPM    Atrial Rate 105 BPM    P-R Interval 176 ms    QRS Duration 138 ms    Q-T Interval 394 ms    QTC Calculation (Bezet) 520 ms    Calculated P Axis 62 degrees    Calculated R Axis 99 degrees    Calculated T Axis -52 degrees    Diagnosis       Sinus tachycardia with occasional premature ventricular complexes  Possible Left atrial enlargement  Right bundle branch block  Cannot rule out Inferior infarct , age undetermined  Abnormal ECG  When compared with ECG of 31-MAR-2018 14:44,  Right bundle branch block is now present  Minimal criteria for Inferior infarct are now present     POC G3    Collection Time: 05/18/18  5:34 PM   Result Value Ref Range    Device: VENT      FIO2 (POC) 100 %    pH (POC) 7.018 (LL) 7.35 - 7.45      pCO2 (POC) 50.2 (H) 35.0 - 45.0 MMHG    pO2 (POC) 495 (H) 80 - 100 MMHG    HCO3 (POC) 12.9 (L) 22 - 26 MMOL/L    sO2 (POC) 100 (H) 92 - 97 %    Base deficit (POC) 18 mmol/L    Mode ASSIST CONTROL      Tidal volume 360 ml    Set Rate 16 bpm    PEEP/CPAP (POC) 5 cmH2O    Allens test (POC) YES      Inspiratory Time 0.9 sec    Total resp.  rate 16      Site RIGHT RADIAL      Specimen type (POC) ARTERIAL      Performed by [employer]     Volume control YES         Radiologic Studies -   CT HEAD WO CONT    (Results Pending)   CTA CHEST W OR W WO CONT    (Results Pending)   XR CHEST PORT    (Results Pending)       Medications given in the ED-  Medications   sodium chloride (NS) flush 5-10 mL (not administered)   NOREPINephrine (LEVOPHED) 8 mg in 5% dextrose 250mL infusion (25 mcg IntraVENous Rate Change 5/18/18 1811)   DOPamine (INTROPIN) 400 mg in dextrose 5% 250 mL infusion (20 mcg/kg/min × 54.4 kg IntraVENous New Bag 5/18/18 1819)   levoFLOXacin (LEVAQUIN) 750 mg in D5W IVPB (not administered)   piperacillin-tazobactam (ZOSYN) 3.375 g in 0.9% sodium chloride (MBP/ADV) 100 mL MBP (not administered)   sodium bicarbonate 8.4 % (1 mEq/mL) injection 50 mEq (not administered)   vancomycin (VANCOCIN) 1,000 mg in 0.9% sodium chloride (MBP/ADV) 250 mL adv (not administered)   iopamidol (ISOVUE-370) 76 % injection 100 mL (not administered)   sodium chloride 0.9 % bolus infusion 1,000 mL (0 mL IntraVENous IV Completed 5/18/18 1801)   sodium chloride 0.9 % bolus infusion 1,000 mL (0 mL IntraVENous IV Completed 5/18/18 1801)   EPINEPHrine (ADRENALIN) 0.1 mg/mL syringe 1 mg (0.5 mg IntraVENous Given 5/18/18 1700)   EPINEPHrine (ADRENALIN) 0.1 mg/mL syringe 0.5 mg (0.5 mg IntraVENous Given 5/18/18 1732)   EPINEPHrine (ADRENALIN) 0.1 mg/mL syringe 0.5 mg ( IntraVENous Given 5/18/18 1821)   EPINEPHrine (ADRENALIN) 0.1 mg/mL syringe 0.5 mg ( IntraVENous Given 5/18/18 1835)   EPINEPHrine (ADRENALIN) 0.1 mg/mL syringe 1 mg (1 mg IntraVENous Given 5/18/18 1841)         Medical Decision Making   I am the first provider for this patient. I reviewed the vital signs, available nursing notes, past medical history, past surgical history, family history and social history. Vital Signs-Reviewed the patient's vital signs. Pulse Oximetry Analysis - 100% on ventilator     Cardiac Monitor:  Rate: 105 bpm  Rhythm: sinus tach    EKG interpretation: (Preliminary)  5:17 PM  Rhythm: Sinus tachycardia with occasional premature ventricular complexes. Rate (approx.): 105 bpm. Possible left atrial enlargement. RBBB. No STEMI. T wave inversion inferiorly. Post-arrest.   EKG read by Mahesh Dennis MD at 5:22 PM     Records Reviewed: Nursing Notes    Procedures:  Procedures    ED Course:   4:55 PM Initial assessment performed. The patients presenting problems have been discussed, and they are in agreement with the care plan formulated and outlined with them. I have encouraged them to ask questions as they arise throughout their visit.    5:01 PM .5 mg epi administered    5:55 PM Updated family on patient status. Apparently per one of the daughter pt had DNR status and papers, however the daughter who called the EMS was not aware. Meanwhile since pt already intubated and cpr initiated via EMS, and pt on vent at this time, family would like us to pursue full code until further re-assessment and discussion. Freida Duenas in family room.     6:22 PM Discussed patient's history, exam, and available diagnostics results with Alyce Nazario MD, hospitalist, who agrees with would like a call back after results. 6:26 PM Discussed patient's history, exam, and available diagnostics results with . Ayaka Carson, intensivist, who agree with admitting pt. He agrees for CT scan and he would like to be called back with results. If pt's conditions do not improve DNR should be considered. 6:34 PM Discussed patient's history, exam, and available diagnostics results with Alyce Nazario MD, hospitalist, who agrees with admitting to ICU. Imaging results pending. 645 PM per granddaughter who works in telemetry at [facility], at bedside, answered all her questions. The granddaughter says that the patient would get very mad if she woke up and found out that she underwent through such aggressive measures such as intubation, ventilation cpr, pressors; She said that the pt did not want any aggressive measures and wanted to be DNR, however, now family has to come together and decide on best plan for her sake. 7:46 PM Family insists that we pursue with CT scan even with risk of pt coding in scanner. Discussed at this point it is almost futile to pursue further agressive treatment. Family does not want pt to have central line for more pressor support. She is on multiple pressors or current venous access x2 and maximum drip rate with bolus to artificially raise BP, which has dropped multiple times to critically low levels. Pt remains unresponsive. Her left pupil is now 76mm dilated and right is 4mm dilated and both fixed and unreactive to light, which could be due cns bleed and has anoxic brain injury. Maintaining stats % on O2 ventilator. Bicarb was given. mutliple ivf given and still getting. Abx have been ordered. 8:15 PM Family now decided among 5 siblings at bedside who had time to discuss and agree among themselves, that as pt already had DNR status and above condition, would like to stop the drips.  Family now would like pt to be comfort care only. Will wait a some minutes after stopping drips and fluids to stop the ventilator. Diagnosis and Disposition       Critical Care Time:  I have spent 71 minutes of critical care time involved in lab review, consultations with specialist, family decision-making, and documentation. During this entire length of time I was immediately available to the patient. Critical Care: The reason for providing this level of medical care for this critically ill patient was due a critical illness that impaired one or more vital organ systems such that there was a high probability of imminent or life threatening deterioration in the patients condition. This care involved high complexity decision making to assess, manipulate, and support vital system functions, to treat this degreee vital organ system failure and to prevent further life threatening deterioration of the patients condition. Core Measures:  For Hospitalized Patients:    1. Hospitalization Decision Time:  The decision to hospitalize the patient was made by Maya Hawk MD at 5:00 PM on 5/18/2018    2. Aspirin: Aspirin was not given because the patient did not present with a stroke at the time of their Emergency Department evaluation    6:36 PM  Patient is being admitted to the hospital by Bubba Daniel MD . The results of their tests and reasons for their admission have been discussed with them and/or available family. They convey agreement and understanding for the need to be admitted and for their admission diagnosis. 1. Respiratory arrest ([address]    2. Pulmonary infiltrates    3. Cardiac arrest ([address]    4. Sepsis, due to unspecified organism ([address]    5. Elevated troponin    6. Anoxic brain damage ([address]        _______________________________    Attestations:   This note is prepared by Leanna Shah and Bill Phillips, acting as Scribe for Maya Hawk MD.    Maya Hawk MD:  The scribe's documentation has been prepared under my direction and personally reviewed by me in its entirety.   I confirm that the note above accurately reflects all work, treatment, procedures, and medical decision making performed by me.  _______________________________

## 2018-05-18 NOTE — H&P
History & Physical    Patient: Jos Cuadra MRN: [id number]  CSN: [id number]    YOB: 1932  Age: 80 y.o. Sex: female      DOA: 5/18/2018  Primary Care Provider:  Asim Gallo MD      Assessment/Plan     Patient Active Problem List   Diagnosis Code    Respiratory arrest [facility]) R09.2    Dehydration E86.0    Pulmonary infiltrates R91.8    Renal insufficiency N28.9    Sepsis ([facility] A41.9    Cardiac arrest ([facility] I46.9    Shock ([facility] R57.9       Admit to ICU    CRITICAL CARE PLAN    Resp - See vent orders, VAP bundle. HOB>30 degrees. Bronchodilators. Pulmonary critical care consulted. Follow up CXR  May need CT of chest once stable. ID - Follow up blood cx. ANTIBIOTICS - vancomycin, zosyn, levaquin. Trend temps, wbc, LA improving. CVS - Monitor HD. Wean pressors, dopamine, levophed and vasopressin for MAP>65. Shock - likely cardiogenic. Heme/onc - Follow H&H, plts. History of DVT - recent diagnosis, was on xarelto. Renal - Trend BUN, Cr, follow I/O, avalos in place. Check and replace Mg, K, phos. Endocrine -  Follow FSG    Neuro/ Pain/ Sedation - ativan/versed prn. Sedation bundle. Possible anoxic brain injury  Need CT of head when stable. GI - NPO for now. Carolina Dawn Prophylaxis - DVT: was on xarelto, GI: protonix. Prognosis poor  Per reports patient was DNR. Family deciding on further goals of care. For now will admit to ICU, continue with pressors and antibiotics. 70 minutes of critical care time spent in the direct evaluation and treatment of this high risk patient. The reason for providing this level of medical care for this critically ill patient was due a critical illness that impaired one or more vital organ systems such that there was a high probability of imminent or life threatening deterioration in the patients condition.  This care involved high complexity decision making to assess, manipulate, and support vital system functions, to treat this degreee vital organ system failure and to prevent further life threatening deterioration of the patients condition. Estimated length of stay : TBD    CC: cardiac arrest       HPI:     Nesha Montgomery is a 80 y.o. female who has past history of recent DVT, could not get other history. Is brought to ER by EMS secondary to cardiac arrest. Patient is intubated and sedated and history obtained from chart. Per chart, patient's daughter reported that patient was having difficulty breathing today and she went to by decongestant. When she got home, she was in respiratory distress, EMS called and they found patient in agonal breathing and initial rhythm was asystole. CPR initiated she had ROSC, she was subsequently intubated and brought to ER. Unable to obtain past history    Past Surgical History:   Procedure Laterality Date    HX HIP FRACTURE TX         No family history on file. Social History     Social History    Marital status:      Spouse name: N/A    Number of children: N/A    Years of education: N/A     Social History Main Topics    Smoking status: Current Every Day Smoker     Packs/day: 0.50     Years: 70.00    Smokeless tobacco: Never Used    Alcohol use 4.2 oz/week     7 Glasses of wine per week    Drug use: No    Sexual activity: Not on file     Other Topics Concern    Not on file     Social History Narrative    No narrative on file       Prior to Admission medications    Not on File       No Known Allergies    Review of Systems  Unable to obtain          Physical Exam:     Physical Exam:  Visit Vitals    BP (!) 40/16 (BP 1 Location: Left arm, BP Patient Position: At rest)    Pulse (!) 127    Temp 99.2 °F (37.3 °C)    Resp 20    Ht 4' 10\" (1.473 m)    Wt 54.4 kg (120 lb)    SpO2 100%    BMI 25.08 kg/m2      O2 Device: Endotracheal tube    Temp (24hrs), Av.2 °F (37.3 °C), Min:99.2 °F (37.3 °C), Max:99.2 °F (37.3 °C)             General:  Orally intubated sedated. Head:  Normocephalic, without obvious abnormality, atraumatic. Eyes:  Conjunctivae/corneas clear, sclera anicteric, pupils round, minimally reactive. Nose: Nares normal. No drainage or sinus tenderness. Neck: Supple, symmetrical, trachea midline, no adenopathy. Lungs:   Coarse breath sounds bilaterally. Heart:  Regular rate and rhythm, S1, S2 normal, no murmur, click, rub or gallop. Abdomen: Soft, non-tender. Bowel sounds normal. No masses,  No organomegaly. Extremities: Extremities normal, atraumatic, no cyanosis or edema. Capillary refill normal.   Pulses: 2+ and symmetric all extremities.    Skin: Skin color pink, turgor normal. No rashes or lesions   Neurologic: Not tested       Labs Reviewed:    CMP:   Lab Results   Component Value Date/Time     (L) 05/18/2018 05:05 PM    K 4.2 05/18/2018 05:05 PM    CL 94 (L) 05/18/2018 05:05 PM    CO2 16 (L) 05/18/2018 05:05 PM    AGAP 21 (H) 05/18/2018 05:05 PM     (H) 05/18/2018 05:05 PM    BUN 25 (H) 05/18/2018 05:05 PM    CREA 1.49 (H) 05/18/2018 05:05 PM    GFRAA 40 (L) 05/18/2018 05:05 PM    GFRNA 33 (L) 05/18/2018 05:05 PM    CA 8.1 (L) 05/18/2018 05:05 PM    ALB 2.9 (L) 05/18/2018 05:05 PM    TP 6.8 05/18/2018 05:05 PM    GLOB 3.9 05/18/2018 05:05 PM    AGRAT 0.7 (L) 05/18/2018 05:05 PM    SGOT 1246 (H) 05/18/2018 05:05 PM     (H) 05/18/2018 05:05 PM     CBC:   Lab Results   Component Value Date/Time    WBC 12.5 05/18/2018 05:05 PM    HGB 10.7 (L) 05/18/2018 05:05 PM    HCT 34.9 (L) 05/18/2018 05:05 PM     05/18/2018 05:05 PM     All Cardiac Markers in the last 24 hours:   Lab Results   Component Value Date/Time     05/18/2018 05:05 PM    CKMB 2.9 05/18/2018 05:05 PM    CKND1 1.8 05/18/2018 05:05 PM    TROIQ 1.45 ([facility]) 05/18/2018 05:05 PM    TNIPOC 0.84 ([facility]) 05/18/2018 05:08 PM         Procedures/imaging: see electronic medical records for all procedures/Xrays and details which were not copied into this note but were reviewed prior to creation of Plan        CC: Dinesh Tate MD

## 2018-05-18 NOTE — ED TRIAGE NOTES
Pt brought in by EMS, EMS found pt unresponsive on the floor. Initial rhythym asystole, pt given 2 rounds of 1mg epi, shock w/ ROSC. Pt arrives w/ ET tube 6.0 22 at the teeth measurement. Pt given 4 mgs of epinephrinae, 2 mg of versed en route. Pt's family report hx of DVT and pt on xerelto.

## 2018-05-19 VITALS
SYSTOLIC BLOOD PRESSURE: 61 MMHG | WEIGHT: 120 LBS | HEART RATE: 78 BPM | HEIGHT: 58 IN | RESPIRATION RATE: 20 BRPM | DIASTOLIC BLOOD PRESSURE: 45 MMHG | TEMPERATURE: 97.5 F | OXYGEN SATURATION: 80 % | BODY MASS INDEX: 25.19 KG/M2

## 2018-05-19 LAB
ATRIAL RATE: 105 BPM
CALCULATED P AXIS, ECG09: 62 DEGREES
CALCULATED R AXIS, ECG10: 99 DEGREES
CALCULATED T AXIS, ECG11: -52 DEGREES
DIAGNOSIS, 93000: NORMAL
P-R INTERVAL, ECG05: 176 MS
Q-T INTERVAL, ECG07: 394 MS
QRS DURATION, ECG06: 138 MS
QTC CALCULATION (BEZET), ECG08: 520 MS
VENTRICULAR RATE, ECG03: 105 BPM

## 2018-05-19 NOTE — PROGRESS NOTES
Bereavement Note:     responded to the death of Jos Cuadra, who is a 80 y.o., female, offering Spiritual Care to patient and family, see flow sheets for interventions. Date of Death: May 18, 2018    Extended Emergency Contact Information  Primary Emergency Contact: [address] Phone: [phone]  Relation: Daughter  Secondary Emergency Contact: [address] Phone: [phone]  Relation: Daughter                 YES      NO  UNKNOWN  Life Net   []        [x]    []   Eye Bank   [] [x] []   Medical Examiner  []        [x]  []   Going to [place]  []        [x] []      Autopsy   []        [x]         []   Sympathy Card  [x]        []  Bereavement Materials  [x]        []           Business Card Provided  []        [x]              Home: [address] [place], [address] [facility]                           [phone]      Chaplains will continue to follow family and will provide spiritual care as needed.

## 2018-05-19 NOTE — ROUTINE PROCESS
TRANSFER - IN REPORT:    Verbal report received from Joel Watson RN(name) on Mckenzie Garcia  being received from ED(unit) for routine progression of care      Report consisted of patients Situation, Background, Assessment and   Recommendations(SBAR). Information from the following report(s) SBAR, Kardex, Intake/Output, MAR and Recent Results was reviewed with the receiving nurse. Opportunity for questions and clarification was provided.

## 2018-05-19 NOTE — PROGRESS NOTES
Patient arrived on unit via stretcher and on the ventilator. Dual skin and cardiac rhythm verification completed with  primary,RN and David Rosa RN. Dual skin  assessment performed on this patient. 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 Verified and confirmed that patient's cardiac rhythm is Sinus tachy with PAC.      Dr. Ashley Aranda called to follow up on patient and recommend to page Hospitalist to talk to family concern goal of care.  Admission assessment completed. Patient on ventilator , eyes dilated and fixed. Patient has no gag reflex and no cough with suction. Lung sound coarse on auscultation. Patient has active bowel sound. Palpable pulses to all extremities.  Patient's four adult children at bedside which includes both NIK Reed and [employer]. Family clearly voiced that they wanted their mother comfortable. Family seems unclear about taking the patient off the ventilator at this time. Informed family that doctor will be called to talk to them concerning patient goal of care. 8 Called Dr. Mellissa Jean concern patient goal of care. Informed her that family express that they wanted patient comfortable. 2215 Dr. Mellissa Jean spoke with family and comfort care measure was initiated. Dr. Mellissa Jean communicated to primary nurse to start PCA morphine and the extubated patient as discuss with family members. [address] [facility] [address] Dr. Aldair Segal to updated him on patient situation and that family wants full comfort measure and to extubate after PCA morphine was started. 200 Communicated with family that morphine would be started and to verbalize when they was ready for patient to be extubated. 18 Was setting PCA morphine up when patient .  Family at the bedside at this time including both NIK Reed and Mary Hermosillo. Orlin wright MD and . [address] at bedside with family    2334 Pagevictor manuel Huggins    2338 Dr. Danita Huggins notifed that patient . 0 Dr. Danita Huggins at bedside at this time, family in room. 2347 Called [facility] to report time of death, communicated with Jose E Nicole whom noted that patient was not candidate for donation due to age. 1 [facility] for  of  patient, spoke with Shi Sullivan. ETA 1 hour , family updated on information as requested. Family was given patient's hearing aids. 0030 Postmortem cared done.      0122 Body released to [facility]

## 2018-05-19 NOTE — ROUTINE PROCESS
TRANSFER - OUT REPORT:    Verbal report given to [facility]) on Cheryl Romero  being transferred to ICU(unit) for routine progression of care       Report consisted of patients Situation, Background, Assessment and   Recommendations(SBAR). Information from the following report(s) SBAR was reviewed with the receiving nurse. Lines:   Peripheral IV 05/18/18 Left Antecubital (Active)   Site Assessment Clean, dry, & intact 5/18/2018  5:04 PM   Phlebitis Assessment 0 5/18/2018  5:04 PM   Infiltration Assessment 0 5/18/2018  5:04 PM   Dressing Status Clean, dry, & intact 5/18/2018  5:04 PM   Dressing Type Transparent 5/18/2018  5:04 PM       Peripheral IV 05/18/18 Right Antecubital (Active)   Site Assessment Clean, dry, & intact 5/18/2018  5:05 PM   Phlebitis Assessment 0 5/18/2018  5:05 PM   Infiltration Assessment 0 5/18/2018  5:05 PM   Dressing Status Clean, dry, & intact 5/18/2018  5:05 PM   Dressing Type Transparent 5/18/2018  5:05 PM       Peripheral IV 81/07/40 Left Cephalic (Active)   Site Assessment Clean, dry, & intact 5/18/2018  5:05 PM   Phlebitis Assessment 0 5/18/2018  5:05 PM   Infiltration Assessment 0 5/18/2018  5:05 PM   Dressing Type Transparent 5/18/2018  5:05 PM       Peripheral IV 34/18/69 Left Cephalic (Active)   Site Assessment Clean, dry, & intact 5/18/2018  5:05 PM   Phlebitis Assessment 0 5/18/2018  5:05 PM   Infiltration Assessment 0 5/18/2018  5:05 PM   Dressing Status Clean, dry, & intact 5/18/2018  5:05 PM   Dressing Type Transparent 5/18/2018  5:05 PM   Hub Color/Line Status Green;Flushed 5/18/2018  5:05 PM   Action Taken Blood drawn 5/18/2018  5:05 PM   Alcohol Cap Used Yes 5/18/2018  5:05 PM        Opportunity for questions and clarification was provided.       Patient transported with:   Monitor  O2 @ vent liters  Registered Nurse  Tech

## 2018-05-19 NOTE — ED NOTES
Bedside shift change report given to Jose Angel shukla (oncoming nurse) by sreedhar shultz (offgoing nurse). Report included the following information SBAR, Kardex, ED Summary, Procedure Summary, Intake/Output, MAR, Recent Results and Cardiac Rhythm sinus tach .

## 2018-05-19 NOTE — PROGRESS NOTES
Addendum to admission   Discussed with all children 3 daughters and son overall prognosis  They are in agreement with complete comfort measures   And extubation  Will start morphine pca for comfort to help with any respiratory  distress   palliative care admission order set attached to initial orders

## 2018-05-19 NOTE — PROGRESS NOTES
CALLED TO SEE PATIENT FOR DEATH   TIME OF DEATH 8375  NON RESPONSIVE TO PAINFUL OR VERBAL STIMULI no pulse/bp pupils non reactive   FAMILY AT BEDSIDE PATIENT WAS PALLATIVE AND COMFORT MEASURES

## 2018-05-19 NOTE — PROGRESS NOTES
responded to Code for  Lugii De Leon, who is a 80 y.o.,female,     The  provided the following Interventions:  Provided crisis pastoral care and pastoral support. Offered prayers on behalf for the patient. Chart reviewed. The following outcomes were achieved:  Patient was successfully resuscitated. Taken to ICU    Assessment:  There are no spiritual or Episcopal issues which require intervention at this time. Plan:  Chaplains will continue to follow and will provide pastoral care on an as needed/requested basis.  recommends bedside caregivers page  on duty if patient shows signs of acute spiritual or emotional distress.

## 2018-05-19 NOTE — PROGRESS NOTES
D/w . Pt had asystole cardiac arrest with about 10 min downtime before cpr started by ems, then about 20 min cpr by ems. Intubated in field and brought to er. Hx of dvt. On arrival to er, unresponsive, nonreactive pupils. Later on assymetric pupils and remained unresponsive on ventilator with severe mixed acidosis. Persistent hypotension despite of multiple fluid boluses and vasopressors. Too unstable to go for ct head and chest. Worsening clinical condition in er. Family decided to stop vasopressors. Brought to icu. Dr. Anastasiya Menjivar d/w family, family decided to start comfort measures with morphine drip and then compassionate extubation. I have d/w Dr. Cristine Narayan, ICU RN, Dr. Anastasiya Menjivar on phone.      Isael Atkinson MD 5/18/2018

## 2018-05-19 NOTE — ED NOTES
Late entry:  This nurse spoke with MD Salbador Tijerina, off going RN, and Nursing Supervisor about concerns of vasopressors infusing peripherally and potentially placing a central line d\t pts instability placing a central line, this nurse was told that pt's family was \"on the fence about invasive procedures\", This nurse expressed concerns of transporting pt to CT and was told to administer 1mg epi IBP

## 2018-05-20 NOTE — DISCHARGE SUMMARY
Admit Date: 5/18/2018   Date of Death: 05/18/2018   Patient ID:   Khanh Perez   80 y.o.   2/20/1932   Time of Death: 2251   Cause of Death: shock, cardiac arrest, respiratory arrest, pneumonia, shock liver. Diagnosis   Patient Active Problem List    Diagnosis Date Noted    Respiratory arrest ([facility] [address] 05/18/2018    Dehydration 05/18/2018    Pulmonary infiltrates 05/18/2018    Renal insufficiency 05/18/2018    Sepsis ([facility] [address] 05/18/2018    Cardiac arrest ([facility] [address] 05/18/2018    Shock ([facility] [address] 05/18/2018    Hypotension 05/18/2018    Elevated troponin 05/18/2018    Abnormal LFTs 05/18/2018      Hospital Course:   Khanh Perez is a 80 y.o. female who has past history of recent DVT, could not get other history. Is brought to ER by EMS secondary to cardiac arrest. Patient is intubated and sedated and history obtained from chart. Per chart, patient's daughter reported that patient was having difficulty breathing today and she went to by decongestant. When she got home, she was in respiratory distress, EMS called and they found patient in agonal breathing and initial rhythm was asystole. CPR initiated she had ROSC, she was subsequently intubated and brought to ER. After admission patient with persistent hypotension despite of multiple fluid boluses and vasopressors. Too unstable to go for ct head and chest. Worsening clinical condition in er. Family decided to stop vasopressors. Brought to icu. Dr. Anastasiya Menjivar d/w family, family decided to start comfort measures with morphine drip and then compassionate extubation.    PCP: Sanjay Benz MD

## 2018-05-24 LAB
BACTERIA SPEC CULT: NORMAL
BACTERIA SPEC CULT: NORMAL
SERVICE CMNT-IMP: NORMAL
SERVICE CMNT-IMP: NORMAL